# Patient Record
(demographics unavailable — no encounter records)

---

## 2024-10-14 NOTE — PHYSICAL EXAM
[Hearing Woodall Test (Tuning Fork On Forehead)] : no lateralization of tone [] : septum deviated to the right [Midline] : trachea located in midline position [Normal] : no rashes [Hearing Loss Right Only] : normal [Hearing Loss Left Only] : normal [FreeTextEntry9] :  cerumen removed anteriorly via curettage [de-identified] : blood on the TM [de-identified] :  mildly inflamed turbinates [de-identified] : class 2 [FreeTextEntry2] :  sinuses nontender to percussion

## 2024-10-14 NOTE — HISTORY OF PRESENT ILLNESS
[de-identified] : Patient presents today stating that she has had left ear pain for the past 3 weeks. She states that this started after using an ear cleaning tool. She denies changes to her hearing.

## 2024-10-14 NOTE — CONSULT LETTER
[Dear  ___] : Dear  [unfilled], [Consult Letter:] : I had the pleasure of evaluating your patient, [unfilled]. [Please see my note below.] : Please see my note below. [Consult Closing:] : Thank you very much for allowing me to participate in the care of this patient.  If you have any questions, please do not hesitate to contact me. [Sincerely,] : Sincerely, [FreeTextEntry3] :  Carter Alfaro MD FACS

## 2024-10-14 NOTE — ASSESSMENT
[FreeTextEntry1] :  Reviewed and reconciled medications, allergies, PMHx, PSHx, SocHx, FMHx.  Patient presents today stating that she has had left ear pain for the past 3 weeks. She states that this started after using an ear cleaning tool. She denies changes to her hearing.   Physical exam: -NOSE score 0 -TNSS 0 -left ear canal: cerumen removed anteriorly via curettage -left TM: blood on the TM -no lateralization to tuning forks -deviated septum right -mildly inflamed turbinates -class 2 tonsils   Plan: -Start Ofloxacin ear drops - 6 drops BID in the left ear for a week -FU as needed

## 2024-10-14 NOTE — ADDENDUM
[FreeTextEntry1] :  Documented by Nik Hu acting as scribe for Dr. Alfaro on 10/14/2024. All Medical record entries made by the Scribe were at my, Dr. Alfaro, direction and personally dictated by me on 10/14/2024 . I have reviewed the chart and agree that the record accurately reflects my personal performance of the history, physical exam, assessment and plan. I have also personally directed, reviewed, and agreed with the discharge instructions.